# Patient Record
Sex: MALE | Race: WHITE | NOT HISPANIC OR LATINO | URBAN - METROPOLITAN AREA
[De-identification: names, ages, dates, MRNs, and addresses within clinical notes are randomized per-mention and may not be internally consistent; named-entity substitution may affect disease eponyms.]

---

## 2020-01-27 ENCOUNTER — OFFICE (OUTPATIENT)
Dept: URBAN - METROPOLITAN AREA CLINIC 11 | Facility: CLINIC | Age: 60
End: 2020-01-27

## 2020-01-27 VITALS
SYSTOLIC BLOOD PRESSURE: 147 MMHG | HEIGHT: 77 IN | WEIGHT: 303 LBS | HEART RATE: 113 BPM | DIASTOLIC BLOOD PRESSURE: 92 MMHG

## 2020-01-27 DIAGNOSIS — R10.2 PELVIC AND PERINEAL PAIN: ICD-10-CM

## 2020-01-27 DIAGNOSIS — M43.8X9 OTHER SPECIFIED DEFORMING DORSOPATHIES, SITE UNSPECIFIED: ICD-10-CM

## 2020-01-27 DIAGNOSIS — C61 MALIGNANT NEOPLASM OF PROSTATE: ICD-10-CM

## 2020-01-27 LAB — ALKALINE PHOSPHATASE: 124 IU/L — HIGH (ref 39–117)

## 2020-01-27 PROCEDURE — 99204 OFFICE O/P NEW MOD 45 MIN: CPT | Performed by: INTERNAL MEDICINE

## 2020-01-27 NOTE — SERVICEHPINOTES
He presents for evaluatino of a persistent lower abdomina pain.  He stated that he ahd a dx of prostate cancer in 2018 and a prostate surgery in April 2019.  He did not have XRT.  In the "late summer I started havin a pain my left side."  He stated that the pain was sharp and he points to an area above the pelvic crest in the mid axillary line. There a "discomfort" most all of the time ni the area.  He stated that he was seen by Dr. Avery and had a CT of the abdomen that was wtihout hernation.  He was seen by Dr. Young's office and referred her for a possible colonoscopy.  The pain is worse with standing for a "long time".  It is not associated with eating or stooling.  He has not noted pain with lying or sitting. He has not had sciatica.  He hassome associated low back pain.  He has not had a bone scan.  He has been on Mobic which has not helped. He has not had a particular change in his stool patterns. He does have a variable stool pattern and is "more constipated".  He may have normal stools for a few weeks and then skip three days.  Rarely he will have diarrhea after eating.  He has not been taking laxatives. He has not had rectal bleeding or blood in his stools. He had a colonoscopy in 2012 with a benign colon bx and did not have adenomatous polyps. He has not had a family hx of colon cancer or polyps.

## 2020-01-27 NOTE — SERVICENOTES
We discussed his pelvic and low back pain and that by history and exam that this did not fit a GI pattern well.  I would be concerned about an assocaition with his recent prostate cancer and disucssed other considerations such as bone/muscular pain.  I would like the MRIs as above.  We discussed his colonoscopy in 2012, warning sx/sx that would prompt an earlier colonoscopy, lack of family hx of polyps/cancer and that we can do stool cards for occult blood.

## 2021-05-11 ENCOUNTER — OFFICE (OUTPATIENT)
Dept: URBAN - METROPOLITAN AREA CLINIC 11 | Facility: CLINIC | Age: 61
End: 2021-05-11

## 2021-05-11 VITALS
HEART RATE: 89 BPM | OXYGEN SATURATION: 97 % | SYSTOLIC BLOOD PRESSURE: 166 MMHG | HEIGHT: 77 IN | WEIGHT: 315 LBS | DIASTOLIC BLOOD PRESSURE: 87 MMHG

## 2021-05-11 DIAGNOSIS — K92.1 MELENA: ICD-10-CM

## 2021-05-11 LAB
CBC, PLATELET, NO DIFFERENTIAL: HEMATOCRIT: 43.3 % (ref 37.5–51)
CBC, PLATELET, NO DIFFERENTIAL: HEMOGLOBIN: 13.6 G/DL (ref 13–17.7)
CBC, PLATELET, NO DIFFERENTIAL: MCH: 25.3 PG — LOW (ref 26.6–33)
CBC, PLATELET, NO DIFFERENTIAL: MCHC: 31.4 G/DL — LOW (ref 31.5–35.7)
CBC, PLATELET, NO DIFFERENTIAL: MCV: 81 FL (ref 79–97)
CBC, PLATELET, NO DIFFERENTIAL: NRBC: (no result)
CBC, PLATELET, NO DIFFERENTIAL: PLATELETS: 229 X10E3/UL (ref 150–450)
CBC, PLATELET, NO DIFFERENTIAL: RBC: 5.38 X10E6/UL (ref 4.14–5.8)
CBC, PLATELET, NO DIFFERENTIAL: RDW: 15.8 % — HIGH (ref 11.6–15.4)
CBC, PLATELET, NO DIFFERENTIAL: WBC: 5.4 X10E3/UL (ref 3.4–10.8)

## 2021-05-11 PROCEDURE — 99214 OFFICE O/P EST MOD 30 MIN: CPT | Performed by: INTERNAL MEDICINE

## 2021-05-11 RX ORDER — POLYETHYLENE GLYCOL 3350, SODIUM SULFATE, SODIUM CHLORIDE, POTASSIUM CHLORIDE, ASCORBIC ACID, SODIUM ASCORBATE 140-9-5.2G
KIT ORAL
Qty: 1 | Refills: 0 | Status: COMPLETED
Start: 2021-05-11 | End: 2021-05-19

## 2021-05-11 NOTE — SERVICENOTES
D/w pt and wife.  We discussed his recent bleeding and hx of XRT.  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 He will need a colonoscopy for further evaluation.  We discussed a possible tx plan if this is XRT including APC and recurrent FSC as well as the typical course and potetnial recurrent issues.

## 2021-05-11 NOTE — SERVICEHPINOTES
"A couple of weeks ago I passed blood, like jelly."  He stated that he just past the blood alone and had not done that in the past.  Since then he has had it one other time with what sounded like clots.  He stated that it was a small amount.  He has also been having spots of blood in his normal stools since then.  He has not had pain with it.  He has not had dizziness, sob, cp, or other associated issues.  He has stated that he has a normal stool daily. He has diabetes and is controlled with meds.He is not on anticoagulation.He had covid around Thanksgiving. He had prostate cancer in 2019 and had surgery.  Last year he had radiation for 7 weeks in 2020 (March/April).

## 2021-05-19 ENCOUNTER — OFFICE (OUTPATIENT)
Dept: URBAN - METROPOLITAN AREA PATHOLOGY 22 | Facility: PATHOLOGY | Age: 61
End: 2021-05-19
Payer: COMMERCIAL

## 2021-05-19 ENCOUNTER — AMBULATORY SURGICAL CENTER (OUTPATIENT)
Dept: URBAN - METROPOLITAN AREA SURGERY 3 | Facility: SURGERY | Age: 61
End: 2021-05-19
Payer: COMMERCIAL

## 2021-05-19 ENCOUNTER — AMBULATORY SURGICAL CENTER (OUTPATIENT)
Dept: URBAN - METROPOLITAN AREA SURGERY 3 | Facility: SURGERY | Age: 61
End: 2021-05-19

## 2021-05-19 VITALS
SYSTOLIC BLOOD PRESSURE: 123 MMHG | RESPIRATION RATE: 16 BRPM | OXYGEN SATURATION: 95 % | TEMPERATURE: 97.3 F | DIASTOLIC BLOOD PRESSURE: 74 MMHG | HEART RATE: 97 BPM | DIASTOLIC BLOOD PRESSURE: 74 MMHG | SYSTOLIC BLOOD PRESSURE: 128 MMHG | RESPIRATION RATE: 21 BRPM | WEIGHT: 315 LBS | DIASTOLIC BLOOD PRESSURE: 56 MMHG | HEIGHT: 77 IN | TEMPERATURE: 97.3 F | RESPIRATION RATE: 19 BRPM | RESPIRATION RATE: 22 BRPM | RESPIRATION RATE: 16 BRPM | RESPIRATION RATE: 21 BRPM | DIASTOLIC BLOOD PRESSURE: 56 MMHG | SYSTOLIC BLOOD PRESSURE: 118 MMHG | TEMPERATURE: 97.1 F | DIASTOLIC BLOOD PRESSURE: 75 MMHG | HEART RATE: 94 BPM | SYSTOLIC BLOOD PRESSURE: 123 MMHG | DIASTOLIC BLOOD PRESSURE: 56 MMHG | HEART RATE: 111 BPM | HEART RATE: 103 BPM | SYSTOLIC BLOOD PRESSURE: 128 MMHG | DIASTOLIC BLOOD PRESSURE: 75 MMHG | SYSTOLIC BLOOD PRESSURE: 118 MMHG | HEART RATE: 97 BPM | HEART RATE: 97 BPM | RESPIRATION RATE: 19 BRPM | HEART RATE: 103 BPM | OXYGEN SATURATION: 96 % | HEART RATE: 111 BPM | SYSTOLIC BLOOD PRESSURE: 128 MMHG | HEART RATE: 94 BPM | DIASTOLIC BLOOD PRESSURE: 48 MMHG | SYSTOLIC BLOOD PRESSURE: 123 MMHG | HEIGHT: 77 IN | RESPIRATION RATE: 19 BRPM | TEMPERATURE: 97.1 F | DIASTOLIC BLOOD PRESSURE: 74 MMHG | RESPIRATION RATE: 22 BRPM | WEIGHT: 315 LBS | DIASTOLIC BLOOD PRESSURE: 48 MMHG | DIASTOLIC BLOOD PRESSURE: 75 MMHG | HEART RATE: 103 BPM | TEMPERATURE: 97.3 F | DIASTOLIC BLOOD PRESSURE: 48 MMHG | HEART RATE: 94 BPM | RESPIRATION RATE: 16 BRPM | SYSTOLIC BLOOD PRESSURE: 118 MMHG | HEART RATE: 111 BPM | OXYGEN SATURATION: 96 % | HEIGHT: 77 IN | OXYGEN SATURATION: 96 % | WEIGHT: 315 LBS | TEMPERATURE: 97.1 F | RESPIRATION RATE: 22 BRPM | OXYGEN SATURATION: 95 % | OXYGEN SATURATION: 95 % | RESPIRATION RATE: 21 BRPM

## 2021-05-19 DIAGNOSIS — K92.1 MELENA: ICD-10-CM

## 2021-05-19 DIAGNOSIS — I10 ESSENTIAL (PRIMARY) HYPERTENSION: ICD-10-CM

## 2021-05-19 DIAGNOSIS — D12.4 BENIGN NEOPLASM OF DESCENDING COLON: ICD-10-CM

## 2021-05-19 DIAGNOSIS — D12.5 BENIGN NEOPLASM OF SIGMOID COLON: ICD-10-CM

## 2021-05-19 DIAGNOSIS — K57.30 DIVERTICULOSIS OF LARGE INTESTINE WITHOUT PERFORATION OR ABS: ICD-10-CM

## 2021-05-19 DIAGNOSIS — K55.20 ANGIODYSPLASIA OF COLON WITHOUT HEMORRHAGE: ICD-10-CM

## 2021-05-19 PROBLEM — K63.5 POLYP OF COLON: Status: ACTIVE | Noted: 2021-05-19

## 2021-05-19 PROBLEM — K63.89 OTHER SPECIFIED DISEASES OF INTESTINE: Status: ACTIVE | Noted: 2021-05-19

## 2021-05-19 PROCEDURE — 45388 COLONOSCOPY W/ABLATION: CPT | Performed by: INTERNAL MEDICINE

## 2021-05-19 PROCEDURE — 45380 COLONOSCOPY AND BIOPSY: CPT | Mod: 59 | Performed by: INTERNAL MEDICINE

## 2021-05-19 PROCEDURE — 45385 COLONOSCOPY W/LESION REMOVAL: CPT | Mod: 59 | Performed by: INTERNAL MEDICINE

## 2021-05-19 PROCEDURE — 88305 TISSUE EXAM BY PATHOLOGIST: CPT | Performed by: INTERNAL MEDICINE

## 2021-06-16 ENCOUNTER — AMBULATORY SURGICAL CENTER (OUTPATIENT)
Dept: URBAN - METROPOLITAN AREA SURGERY 3 | Facility: SURGERY | Age: 61
End: 2021-06-16

## 2021-06-16 VITALS
DIASTOLIC BLOOD PRESSURE: 85 MMHG | SYSTOLIC BLOOD PRESSURE: 152 MMHG | RESPIRATION RATE: 18 BRPM | OXYGEN SATURATION: 98 % | HEART RATE: 94 BPM | RESPIRATION RATE: 19 BRPM | RESPIRATION RATE: 18 BRPM | HEART RATE: 94 BPM | SYSTOLIC BLOOD PRESSURE: 140 MMHG | TEMPERATURE: 97.3 F | WEIGHT: 315 LBS | WEIGHT: 315 LBS | DIASTOLIC BLOOD PRESSURE: 78 MMHG | HEIGHT: 77 IN | HEART RATE: 90 BPM | SYSTOLIC BLOOD PRESSURE: 152 MMHG | OXYGEN SATURATION: 99 % | OXYGEN SATURATION: 99 % | DIASTOLIC BLOOD PRESSURE: 78 MMHG | RESPIRATION RATE: 19 BRPM | TEMPERATURE: 97.3 F | OXYGEN SATURATION: 98 % | SYSTOLIC BLOOD PRESSURE: 140 MMHG | HEART RATE: 90 BPM | DIASTOLIC BLOOD PRESSURE: 85 MMHG | HEIGHT: 77 IN

## 2021-06-16 DIAGNOSIS — K62.7 RADIATION PROCTITIS: ICD-10-CM

## 2021-06-16 DIAGNOSIS — K55.20 ANGIODYSPLASIA OF COLON WITHOUT HEMORRHAGE: ICD-10-CM

## 2021-06-16 PROCEDURE — 45346 SIGMOIDOSCOPY W/ABLATION: CPT | Performed by: INTERNAL MEDICINE

## 2021-09-14 ENCOUNTER — OFFICE (OUTPATIENT)
Dept: URBAN - METROPOLITAN AREA CLINIC 11 | Facility: CLINIC | Age: 61
End: 2021-09-14

## 2021-09-14 VITALS
OXYGEN SATURATION: 97 % | SYSTOLIC BLOOD PRESSURE: 129 MMHG | HEIGHT: 77 IN | DIASTOLIC BLOOD PRESSURE: 86 MMHG | WEIGHT: 315 LBS | HEART RATE: 87 BPM

## 2021-09-14 DIAGNOSIS — K62.7 RADIATION PROCTITIS: ICD-10-CM
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
## 2021-09-14 PROCEDURE — 99213 OFFICE O/P EST LOW 20 MIN: CPT | Performed by: INTERNAL MEDICINE

## 2021-09-14 NOTE — SERVICENOTES
He has been doing quite well.  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  At this point, I would have him continue to f/u with Dr. Earl and Dr. Young and call us as needed.